# Patient Record
Sex: FEMALE | Race: BLACK OR AFRICAN AMERICAN | NOT HISPANIC OR LATINO
[De-identification: names, ages, dates, MRNs, and addresses within clinical notes are randomized per-mention and may not be internally consistent; named-entity substitution may affect disease eponyms.]

---

## 2018-04-04 ENCOUNTER — RESULT REVIEW (OUTPATIENT)
Age: 53
End: 2018-04-04

## 2019-05-21 ENCOUNTER — RESULT REVIEW (OUTPATIENT)
Age: 54
End: 2019-05-21

## 2020-10-08 PROBLEM — Z00.00 ENCOUNTER FOR PREVENTIVE HEALTH EXAMINATION: Status: ACTIVE | Noted: 2020-10-08

## 2020-10-20 ENCOUNTER — APPOINTMENT (OUTPATIENT)
Dept: GASTROENTEROLOGY | Facility: CLINIC | Age: 55
End: 2020-10-20
Payer: COMMERCIAL

## 2020-10-20 VITALS
OXYGEN SATURATION: 98 % | HEIGHT: 63 IN | WEIGHT: 165 LBS | HEART RATE: 108 BPM | BODY MASS INDEX: 29.23 KG/M2 | TEMPERATURE: 98 F | DIASTOLIC BLOOD PRESSURE: 80 MMHG | SYSTOLIC BLOOD PRESSURE: 120 MMHG

## 2020-10-20 DIAGNOSIS — Z12.11 ENCOUNTER FOR SCREENING FOR MALIGNANT NEOPLASM OF COLON: ICD-10-CM

## 2020-10-20 DIAGNOSIS — Z82.3 FAMILY HISTORY OF STROKE: ICD-10-CM

## 2020-10-20 DIAGNOSIS — Z78.9 OTHER SPECIFIED HEALTH STATUS: ICD-10-CM

## 2020-10-20 DIAGNOSIS — Z72.89 OTHER PROBLEMS RELATED TO LIFESTYLE: ICD-10-CM

## 2020-10-20 DIAGNOSIS — Z86.79 PERSONAL HISTORY OF OTHER DISEASES OF THE CIRCULATORY SYSTEM: ICD-10-CM

## 2020-10-20 DIAGNOSIS — Z20.828 CONTACT WITH AND (SUSPECTED) EXPOSURE TO OTHER VIRAL COMMUNICABLE DISEASES: ICD-10-CM

## 2020-10-20 DIAGNOSIS — Z86.39 PERSONAL HISTORY OF OTHER ENDOCRINE, NUTRITIONAL AND METABOLIC DISEASE: ICD-10-CM

## 2020-10-20 PROCEDURE — 99243 OFF/OP CNSLTJ NEW/EST LOW 30: CPT

## 2020-10-20 RX ORDER — SODIUM SULFATE, POTASSIUM SULFATE, MAGNESIUM SULFATE 17.5; 3.13; 1.6 G/ML; G/ML; G/ML
17.5-3.13-1.6 SOLUTION, CONCENTRATE ORAL
Qty: 1 | Refills: 0 | Status: ACTIVE | COMMUNITY
Start: 2020-10-20 | End: 1900-01-01

## 2020-10-20 RX ORDER — LEVOTHYROXINE SODIUM 125 UG/1
125 TABLET ORAL
Refills: 0 | Status: ACTIVE | COMMUNITY

## 2020-10-20 NOTE — PHYSICAL EXAM
[General Appearance - Alert] : alert [PERRL With Normal Accommodation] : pupils were equal in size, round, and reactive to light [General Appearance - In No Acute Distress] : in no acute distress [Sclera] : the sclera and conjunctiva were normal [Oropharynx] : the oropharynx was normal [Outer Ear] : the ears and nose were normal in appearance [Extraocular Movements] : extraocular movements were intact [Neck Cervical Mass (___cm)] : no neck mass was observed [Neck Appearance] : the appearance of the neck was normal [Jugular Venous Distention Increased] : there was no jugular-venous distention [Thyroid Diffuse Enlargement] : the thyroid was not enlarged [Thyroid Nodule] : there were no palpable thyroid nodules [Auscultation Breath Sounds / Voice Sounds] : lungs were clear to auscultation bilaterally [Heart Rate And Rhythm] : heart rate was normal and rhythm regular [Heart Sounds Gallop] : no gallops [Heart Sounds] : normal S1 and S2 [Heart Sounds Pericardial Friction Rub] : no pericardial rub [Murmurs] : no murmurs [Abdomen Soft] : soft [Bowel Sounds] : normal bowel sounds [] : no hepato-splenomegaly [Abdomen Tenderness] : non-tender [Cervical Lymph Nodes Enlarged Posterior Bilaterally] : posterior cervical [Abdomen Mass (___ Cm)] : no abdominal mass palpated [Cervical Lymph Nodes Enlarged Anterior Bilaterally] : anterior cervical [Supraclavicular Lymph Nodes Enlarged Bilaterally] : supraclavicular [No CVA Tenderness] : no ~M costovertebral angle tenderness [No Spinal Tenderness] : no spinal tenderness [Abnormal Walk] : normal gait [Nail Clubbing] : no clubbing  or cyanosis of the fingernails [Musculoskeletal - Swelling] : no joint swelling seen [Motor Tone] : muscle strength and tone were normal [No Focal Deficits] : no focal deficits [Deep Tendon Reflexes (DTR)] : deep tendon reflexes were 2+ and symmetric [Sensation] : the sensory exam was normal to light touch and pinprick

## 2020-10-20 NOTE — HISTORY OF PRESENT ILLNESS
[Nausea] : denies nausea [Heartburn] : denies heartburn [Vomiting] : denies vomiting [Constipation] : denies constipation [Diarrhea] : denies diarrhea [Abdominal Swelling] : denies abdominal swelling [Yellow Skin Or Eyes (Jaundice)] : denies jaundice [Rectal Pain] : denies rectal pain [Cholelithiasis] : cholelithiasis [Cholecystectomy] : cholecystectomy [_________] : Performed [unfilled] [Wt Gain ___ Lbs] : no recent weight gain [Wt Loss ___ Lbs] : no recent weight loss [GERD] : no gastroesophageal reflux disease [Peptic Ulcer Disease] : no peptic ulcer disease [Hiatus Hernia] : no hiatus hernia [Pancreatitis] : no pancreatitis [Kidney Stone] : no kidney stone [Irritable Bowel Syndrome] : no irritable bowel syndrome [Inflammatory Bowel Disease] : no inflammatory bowel disease [Diverticulitis] : no diverticulitis [Alcohol Abuse] : no alcohol abuse [Malignancy] : no malignancy [Abdominal Surgery] : no abdominal surgery [Appendectomy] : no appendectomy [de-identified] : 55 year old woman presents for a screening colonoscopy. Her last colonoscopy was over 5 years ago. she denies rectal bleeding, melena or hematemesis. [de-identified] : negative

## 2020-10-20 NOTE — CONSULT LETTER
[Consult Letter:] : I had the pleasure of evaluating your patient, [unfilled]. [Dear  ___] : Dear  [unfilled], [Sincerely,] : Sincerely, [Consult Closing:] : Thank you very much for allowing me to participate in the care of this patient.  If you have any questions, please do not hesitate to contact me. [Please see my note below.] : Please see my note below. [FreeTextEntry3] : Levon Galvan MD, FACG\par

## 2020-12-23 PROBLEM — Z12.11 ENCOUNTER FOR SCREENING COLONOSCOPY: Status: RESOLVED | Noted: 2020-10-20 | Resolved: 2020-12-23

## 2021-02-05 ENCOUNTER — RESULT REVIEW (OUTPATIENT)
Age: 56
End: 2021-02-05

## 2021-02-24 ENCOUNTER — APPOINTMENT (OUTPATIENT)
Dept: GASTROENTEROLOGY | Facility: AMBULATORY MEDICAL SERVICES | Age: 56
End: 2021-02-24

## 2021-08-18 ENCOUNTER — APPOINTMENT (OUTPATIENT)
Dept: GASTROENTEROLOGY | Facility: AMBULATORY MEDICAL SERVICES | Age: 56
End: 2021-08-18
Payer: COMMERCIAL

## 2021-08-18 PROCEDURE — 45378 DIAGNOSTIC COLONOSCOPY: CPT | Mod: 33

## 2022-03-11 ENCOUNTER — RESULT REVIEW (OUTPATIENT)
Age: 57
End: 2022-03-11

## 2022-03-11 ENCOUNTER — APPOINTMENT (OUTPATIENT)
Dept: OBGYN | Facility: CLINIC | Age: 57
End: 2022-03-11
Payer: COMMERCIAL

## 2022-03-11 PROCEDURE — 99396 PREV VISIT EST AGE 40-64: CPT | Mod: 25

## 2022-03-11 PROCEDURE — 82270 OCCULT BLOOD FECES: CPT

## 2022-03-11 PROCEDURE — 99000 SPECIMEN HANDLING OFFICE-LAB: CPT

## 2022-03-11 PROCEDURE — 81002 URINALYSIS NONAUTO W/O SCOPE: CPT

## 2022-05-11 ENCOUNTER — APPOINTMENT (OUTPATIENT)
Dept: UROGYNECOLOGY | Facility: CLINIC | Age: 57
End: 2022-05-11
Payer: COMMERCIAL

## 2022-05-11 VITALS
TEMPERATURE: 96.8 F | WEIGHT: 168 LBS | BODY MASS INDEX: 29.77 KG/M2 | DIASTOLIC BLOOD PRESSURE: 81 MMHG | HEART RATE: 88 BPM | HEIGHT: 63 IN | SYSTOLIC BLOOD PRESSURE: 148 MMHG

## 2022-05-11 DIAGNOSIS — Z82.49 FAMILY HISTORY OF ISCHEMIC HEART DISEASE AND OTHER DISEASES OF THE CIRCULATORY SYSTEM: ICD-10-CM

## 2022-05-11 DIAGNOSIS — Z83.49 FAMILY HISTORY OF OTHER ENDOCRINE, NUTRITIONAL AND METABOLIC DISEASES: ICD-10-CM

## 2022-05-11 DIAGNOSIS — Z82.3 FAMILY HISTORY OF STROKE: ICD-10-CM

## 2022-05-11 DIAGNOSIS — R35.1 NOCTURIA: ICD-10-CM

## 2022-05-11 DIAGNOSIS — R35.0 FREQUENCY OF MICTURITION: ICD-10-CM

## 2022-05-11 DIAGNOSIS — R39.15 URGENCY OF URINATION: ICD-10-CM

## 2022-05-11 LAB
BILIRUB UR QL STRIP: NORMAL
CLARITY UR: CLEAR
COLLECTION METHOD: NORMAL
GLUCOSE UR-MCNC: NORMAL
HCG UR QL: 0.2 EU/DL
HGB UR QL STRIP.AUTO: NORMAL
KETONES UR-MCNC: NORMAL
LEUKOCYTE ESTERASE UR QL STRIP: NORMAL
NITRITE UR QL STRIP: NORMAL
PH UR STRIP: 6
PROT UR STRIP-MCNC: NORMAL
SP GR UR STRIP: 1.01

## 2022-05-11 PROCEDURE — 51701 INSERT BLADDER CATHETER: CPT

## 2022-05-11 PROCEDURE — 99203 OFFICE O/P NEW LOW 30 MIN: CPT | Mod: 25

## 2022-05-11 RX ORDER — AMLODIPINE BESYLATE 5 MG/1
TABLET ORAL
Refills: 0 | Status: ACTIVE | COMMUNITY

## 2022-05-11 NOTE — PHYSICAL EXAM
[Chaperone Present] : A chaperone was present in the examining room during all aspects of the physical examination [No Acute Distress] : in no acute distress [Well developed] : well developed [Well Nourished] : ~L well nourished [Oriented x3] : oriented to person, place, and time [Normal Memory] : ~T memory was ~L unimpaired [Normal Mood/Affect] : mood and affect are normal [Labia Majora] : were normal [Labia Minora] : were normal [Normal Appearance] : general appearance was normal [No Bleeding] : there was no active vaginal bleeding [Atrophy] : atrophy [Normal Position] : in a normal position [Uterine Adnexae] : were not tender and not enlarged [Post Void Residual ____ml] : post void residual was [unfilled] ml [Normal rectal exam] : was normal [Normal] : was normal [Anxiety] : patient is not anxious [Mass (___ Cm)] : no ~M [unfilled] abdominal mass was palpated [Tenderness] : ~T no ~M abdominal tenderness observed [Distended] : not distended [Supraclavicular LAD] : no adenopathy noted in supraclavicular lymph nodes [de-identified] : no significant prolapse

## 2022-05-11 NOTE — PROCEDURE
[Straight Catheterization] : insertion of a straight catheter [Urinary Frequency] : urinary frequency [Patient] : the patient [___ Fr Straight Tip] : a [unfilled] in [place] straight tip catheter [No Complications] : no complications [Tolerated Well] : the patient tolerated the procedure well [FreeTextEntry1] : A catheterized urine was performed to rule out urinary tract infection and/or retention

## 2022-05-11 NOTE — REASON FOR VISIT
[Initial Visit ___] : an initial visit for [unfilled] [Questionnaire Received] : Patient questionnaire received [Intake Form Reviewed] : Patient intake form with past medical history, surgical history, family history and social history reviewed today [Urine Frequency] : urine frequency [Urinary Urgency] : urinary urgency

## 2022-05-11 NOTE — HISTORY OF PRESENT ILLNESS
[Cystocele (Obstetric)] : no [Uterine Prolapse] : no [Rectal Prolapse] : no [Unable To Restrain Bowel Movement] : no [Urinary Frequency] : no [Urinary Tract Infection] : no [x3+] : nocturia three or more  times a night [Hematuria] : no [] : no [de-identified] : daily 6-10x [de-identified] : 2 [FreeTextEntry9] : double voids but very small amount out on second void [de-identified] : 3-4x [de-identified] : occasional [FreeTextEntry1] : \par \par Yesenia is a 56 yo presenting for urinary frequency and nocturia.  She voids 6-10x per day and has nocturia 3-4x per night waking her from sleep.  She has tried stopping fluids before bed without improvement.  She denies symptoms of prolapse and no leakage.  Drinks 16oz seltzer, 8oz coffee, 4oz juice daily.\par \par She has been to three urologists previously for these symptoms, and tried multiple OAB medications (three) without relief of symptoms.  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

## 2022-05-11 NOTE — DISCUSSION/SUMMARY
[FreeTextEntry1] : I reviewed the above findings.  We discussed possible overactive bladder as well as genitourinary symptoms of menopause \par We discussed genitourinary symptoms of menopause and possible contribution to her symptoms.  We discussed risks and benefits of vaginal estrogen.  She does not wish to start vaginal estrogen.\par For OAB we discussed first line therapies, including Kegel's exercises and behavioral modification. We discussed medications. We also discussed second and third line therapies, including the SNM, Botox, and PTNS.  As above she reports trying medication in the past as well as PTNS.  She would like to think about her options and will do behavioral modification.  We discussed completing a 3-day urolog.  We discussed a cystoscopy as the next first step if she is interested in Botox or SNM.  IUGA patient information an overactive bladder , bladder training Kegel exercises and 3-day oral log was given to her was given to her.  I have asked her to follow-up in the office in approximately 2-3 months.  All questions were answered\par \par

## 2022-05-12 LAB
APPEARANCE: CLEAR
BACTERIA: NEGATIVE
BILIRUBIN URINE: NEGATIVE
BLOOD URINE: NEGATIVE
CALCIUM OXALATE CRYSTALS: ABNORMAL
COLOR: NORMAL
GLUCOSE QUALITATIVE U: NEGATIVE
HYALINE CASTS: 1 /LPF
KETONES URINE: NEGATIVE
LEUKOCYTE ESTERASE URINE: NEGATIVE
MICROSCOPIC-UA: NORMAL
NITRITE URINE: NEGATIVE
PH URINE: 6
PROTEIN URINE: NEGATIVE
RED BLOOD CELLS URINE: 2 /HPF
SPECIFIC GRAVITY URINE: 1.01
SQUAMOUS EPITHELIAL CELLS: 0 /HPF
UROBILINOGEN URINE: NORMAL
WHITE BLOOD CELLS URINE: 0 /HPF

## 2022-05-13 LAB — BACTERIA UR CULT: NORMAL

## 2022-08-15 ENCOUNTER — APPOINTMENT (OUTPATIENT)
Dept: ORTHOPEDIC SURGERY | Facility: CLINIC | Age: 57
End: 2022-08-15

## 2022-08-15 VITALS — HEIGHT: 63 IN | BODY MASS INDEX: 28.7 KG/M2 | WEIGHT: 162 LBS

## 2022-08-15 DIAGNOSIS — M76.892 OTHER SPECIFIED ENTHESOPATHIES OF LEFT LOWER LIMB, EXCLUDING FOOT: ICD-10-CM

## 2022-08-15 DIAGNOSIS — M76.891 OTHER SPECIFIED ENTHESOPATHIES OF RIGHT LOWER LIMB, EXCLUDING FOOT: ICD-10-CM

## 2022-08-15 PROCEDURE — 99204 OFFICE O/P NEW MOD 45 MIN: CPT

## 2022-08-15 PROCEDURE — 73502 X-RAY EXAM HIP UNI 2-3 VIEWS: CPT | Mod: LT

## 2022-08-15 RX ORDER — MELOXICAM 15 MG/1
15 TABLET ORAL
Qty: 30 | Refills: 1 | Status: ACTIVE | COMMUNITY
Start: 2022-08-15 | End: 1900-01-01

## 2022-08-15 NOTE — HISTORY OF PRESENT ILLNESS
[Gradual] : gradual [8] : 8 [6] : 6 [Dull/Aching] : dull/aching [Throbbing] : throbbing [Meds] : meds [de-identified] : 57F p/w jill hip pain. She notes anterior pain bilaterall when she stands and starts to walk. She says it has improved within the last few days bu is still present. She has used ibuprofen once which helped some. She denies trouble with shoes or socks/driving. [] : no [FreeTextEntry1] : jill hips [FreeTextEntry5] :  ERNA SANFORD is a 57 year female who is here today for jill hips pain. She has been having pain for a few months, no known injury. Only [de-identified] : standing up and walking after sitting for a long time.  [de-identified] : MRI

## 2022-08-15 NOTE — ASSESSMENT
[FreeTextEntry1] : 57F p/w jill abductor tendonitis\par \par Begin PT\par Meloxicam for pain\par return 6 weeks

## 2022-08-15 NOTE — PHYSICAL EXAM
[2+] : dorsalis pedis pulse: 2+ [] : patient ambulates without assistive device [Bilateral] : hip with pelvis bilaterally [All Views] : anteroposterior, lateral [There are no fractures, subluxations or dislocations. No significant abnormalities are seen] : There are no fractures, subluxations or dislocations. No significant abnormalities are seen

## 2023-06-20 ENCOUNTER — APPOINTMENT (OUTPATIENT)
Dept: OBGYN | Facility: CLINIC | Age: 58
End: 2023-06-20
Payer: COMMERCIAL

## 2023-06-20 PROCEDURE — 82270 OCCULT BLOOD FECES: CPT

## 2023-06-20 PROCEDURE — 81002 URINALYSIS NONAUTO W/O SCOPE: CPT

## 2023-06-20 PROCEDURE — 99396 PREV VISIT EST AGE 40-64: CPT

## 2024-07-02 ENCOUNTER — APPOINTMENT (OUTPATIENT)
Dept: OBGYN | Facility: CLINIC | Age: 59
End: 2024-07-02
Payer: COMMERCIAL

## 2024-07-02 PROCEDURE — 82270 OCCULT BLOOD FECES: CPT

## 2024-07-02 PROCEDURE — 99396 PREV VISIT EST AGE 40-64: CPT

## 2024-07-02 PROCEDURE — 81002 URINALYSIS NONAUTO W/O SCOPE: CPT

## 2024-07-02 PROCEDURE — G0444 DEPRESSION SCREEN ANNUAL: CPT | Mod: 59

## 2024-12-25 PROBLEM — F10.90 ALCOHOL USE: Status: ACTIVE | Noted: 2020-10-20

## 2025-08-20 ENCOUNTER — APPOINTMENT (OUTPATIENT)
Dept: OBGYN | Facility: CLINIC | Age: 60
End: 2025-08-20
Payer: COMMERCIAL

## 2025-08-20 PROCEDURE — 99396 PREV VISIT EST AGE 40-64: CPT

## 2025-08-20 PROCEDURE — 81002 URINALYSIS NONAUTO W/O SCOPE: CPT

## 2025-08-20 PROCEDURE — 99459 PELVIC EXAMINATION: CPT

## 2025-09-04 ENCOUNTER — APPOINTMENT (OUTPATIENT)
Dept: OBGYN | Facility: CLINIC | Age: 60
End: 2025-09-04
Payer: COMMERCIAL

## 2025-09-04 PROCEDURE — 99213 OFFICE O/P EST LOW 20 MIN: CPT

## 2025-09-04 PROCEDURE — 81002 URINALYSIS NONAUTO W/O SCOPE: CPT
